# Patient Record
Sex: FEMALE | Race: WHITE | NOT HISPANIC OR LATINO | URBAN - METROPOLITAN AREA
[De-identification: names, ages, dates, MRNs, and addresses within clinical notes are randomized per-mention and may not be internally consistent; named-entity substitution may affect disease eponyms.]

---

## 2022-09-06 ENCOUNTER — OFFICE (OUTPATIENT)
Dept: URBAN - METROPOLITAN AREA CLINIC 67 | Facility: CLINIC | Age: 87
End: 2022-09-06

## 2022-09-06 VITALS
HEART RATE: 46 BPM | DIASTOLIC BLOOD PRESSURE: 76 MMHG | OXYGEN SATURATION: 93 % | SYSTOLIC BLOOD PRESSURE: 120 MMHG | HEIGHT: 61 IN | WEIGHT: 115 LBS

## 2022-09-06 DIAGNOSIS — D50.9 IRON DEFICIENCY ANEMIA, UNSPECIFIED: ICD-10-CM

## 2022-09-06 DIAGNOSIS — Z79.01 LONG TERM (CURRENT) USE OF ANTICOAGULANTS: ICD-10-CM

## 2022-09-06 DIAGNOSIS — K63.5 POLYP OF COLON: ICD-10-CM

## 2022-09-06 PROCEDURE — 99204 OFFICE O/P NEW MOD 45 MIN: CPT | Performed by: INTERNAL MEDICINE

## 2022-09-06 NOTE — SERVICEHPINOTES
The patient is seen today for follow-up regarding a history of iron deficiency anemia. Her tTG IgA Ab titer was normal/negative in 6/2017 - an air-contrast barium enema done in 5/2019 was remarkable for a suspected small sessile cecal polyp (5 x 9mm) and left sided diverticulosis.
br   Today, she returns as she was found to have abnormal labs done about 4 weeks ago at the time of a routine check up with Dr. Lomas. Those results are forthcoming but she thinks it may be related to her anemia - perhaps worse than it had been historically.
br She denies any GI tract bleeding symptoms, abdominal discomfort or changes in her usual bowel pattern.

## 2022-09-06 NOTE — SERVICENOTES
I will get a copy of the blood work done through Dr. Lomas' office for review - we discussed that her barium enema done in 2019 showed what appeared to be a small cecal polyp and if she was recently found to have worsening anemia, the next step would be to get a surveillance barium enema. However, we discussed that I would not do the BE unless she were willing to act on the results (e.g. colonoscopy or possible surgery if a cancer was found). 
She assured me that she would want further investigation if an abnormality was found so we will go ahead and start with the BE. 
Further recommendations to follow based on those results.